# Patient Record
Sex: MALE | Race: WHITE | NOT HISPANIC OR LATINO | Employment: FULL TIME | URBAN - METROPOLITAN AREA
[De-identification: names, ages, dates, MRNs, and addresses within clinical notes are randomized per-mention and may not be internally consistent; named-entity substitution may affect disease eponyms.]

---

## 2023-10-30 ENCOUNTER — TELEPHONE (OUTPATIENT)
Dept: PRIMARY CARE | Facility: CLINIC | Age: 33
End: 2023-10-30
Payer: COMMERCIAL

## 2023-10-30 NOTE — TELEPHONE ENCOUNTER
Pt is wanting advice on if he should take an antibiotic for his tic bite. 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  Contact number- [phone]
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
## 2023-11-01 RX ORDER — DOXYCYCLINE 100 MG/1
200 CAPSULE ORAL ONCE
Qty: 2 CAPSULE | Refills: 0 | Status: SHIPPED | OUTPATIENT
Start: 2023-11-01 | End: 2023-11-01

## 2023-11-01 NOTE — TELEPHONE ENCOUNTER
Spoke to pt and he is unsure how long the tick was there, said at least 16 hours or more. Please advise

## 2024-01-23 ENCOUNTER — OFFICE VISIT (OUTPATIENT)
Dept: PRIMARY CARE | Facility: CLINIC | Age: 34
End: 2024-01-23
Payer: COMMERCIAL

## 2024-01-23 VITALS
TEMPERATURE: 97.5 F | WEIGHT: 164 LBS | BODY MASS INDEX: 22.21 KG/M2 | DIASTOLIC BLOOD PRESSURE: 72 MMHG | HEART RATE: 96 BPM | SYSTOLIC BLOOD PRESSURE: 118 MMHG | HEIGHT: 72 IN | OXYGEN SATURATION: 99 %

## 2024-01-23 DIAGNOSIS — J40 BRONCHITIS: ICD-10-CM

## 2024-01-23 DIAGNOSIS — J01.40 ACUTE NON-RECURRENT PANSINUSITIS: Primary | ICD-10-CM

## 2024-01-23 PROCEDURE — 99213 OFFICE O/P EST LOW 20 MIN: CPT

## 2024-01-23 PROCEDURE — 1036F TOBACCO NON-USER: CPT

## 2024-01-23 RX ORDER — FLUTICASONE PROPIONATE 50 MCG
1 SPRAY, SUSPENSION (ML) NASAL DAILY
Qty: 16 G | Refills: 5 | Status: SHIPPED | OUTPATIENT
Start: 2024-01-23 | End: 2025-01-22

## 2024-01-23 RX ORDER — AMOXICILLIN 875 MG/1
875 TABLET, FILM COATED ORAL 2 TIMES DAILY
Qty: 20 TABLET | Refills: 0 | Status: SHIPPED | OUTPATIENT
Start: 2024-01-23 | End: 2024-02-02

## 2024-01-23 RX ORDER — ALBUTEROL SULFATE 90 UG/1
2 AEROSOL, METERED RESPIRATORY (INHALATION) EVERY 4 HOURS PRN
Qty: 8.5 G | Refills: 0 | Status: SHIPPED | OUTPATIENT
Start: 2024-01-23 | End: 2025-01-22

## 2024-01-23 ASSESSMENT — PATIENT HEALTH QUESTIONNAIRE - PHQ9
SUM OF ALL RESPONSES TO PHQ9 QUESTIONS 1 AND 2: 0
2. FEELING DOWN, DEPRESSED OR HOPELESS: NOT AT ALL
1. LITTLE INTEREST OR PLEASURE IN DOING THINGS: NOT AT ALL

## 2024-01-23 ASSESSMENT — PAIN SCALES - GENERAL: PAINLEVEL: 0-NO PAIN

## 2024-01-23 NOTE — PROGRESS NOTES
Subjective   Patient ID: Nicola Toure is a 33 y.o. male who presents for Nasal Congestion (X 2 weeks/Negative for Strep, Flu, and Covid) and Cough.    HPI   Nicola presents with concerns for nasal congestion, PND, pressure in b/l ears and non-productive cough which all initially began about 2 weeks ago.  Episode of fever and chills at the beginning of the symptoms but resolved. He was seen at  on 1/13/24, diagnoses with URI and pharyngitis.  He had gone to  with concerns for strep throat as his children were positive for strep throat at that time.  His sore throat has since resolved.  He reports he was feeling better over the weekend (1/20-1/21) but then symptoms of nasal congestion, PND, and cough returned and seem to be worsening. He reports he has not taken any OTC medications for symptom relief. Denies dyspnea, SOB, or chest pain.     Review of Systems  All other systems have been reviewed and are negative except as noted in the HPI.     Objective   /72 (BP Location: Left arm)   Pulse 96   Temp 36.4 °C (97.5 °F) (Temporal)   Ht 1.829 m (6')   Wt 74.4 kg (164 lb)   SpO2 99%   BMI 22.24 kg/m²     Physical Exam  Vitals and nursing note reviewed.   Constitutional:       General: He is not in acute distress.     Appearance: Normal appearance. He is well-developed, well-groomed and normal weight.   HENT:      Right Ear: Hearing, ear canal and external ear normal. A middle ear effusion is present.      Left Ear: Hearing, tympanic membrane, ear canal and external ear normal.      Nose: Nasal tenderness, mucosal edema, congestion and rhinorrhea present. Rhinorrhea is clear.      Right Sinus: No maxillary sinus tenderness or frontal sinus tenderness.      Left Sinus: No maxillary sinus tenderness or frontal sinus tenderness.      Mouth/Throat:      Lips: Pink.      Mouth: Mucous membranes are dry.      Pharynx: Oropharynx is clear. Uvula midline. Posterior oropharyngeal erythema present. No  pharyngeal swelling, oropharyngeal exudate or uvula swelling.   Cardiovascular:      Rate and Rhythm: Normal rate and regular rhythm.      Heart sounds: Normal heart sounds, S1 normal and S2 normal.   Pulmonary:      Effort: Pulmonary effort is normal. No tachypnea, accessory muscle usage or respiratory distress.      Breath sounds: Normal air entry. No decreased air movement. Examination of the right-upper field reveals wheezing. Examination of the left-upper field reveals wheezing. Wheezing present. No decreased breath sounds, rhonchi or rales.   Musculoskeletal:      Cervical back: Normal range of motion and neck supple.   Lymphadenopathy:      Cervical: No cervical adenopathy.   Skin:     General: Skin is warm and dry.   Neurological:      General: No focal deficit present.      Mental Status: He is alert and oriented to person, place, and time. Mental status is at baseline.   Psychiatric:         Behavior: Behavior is cooperative.         Assessment/Plan   Problem List Items Addressed This Visit    None  Visit Diagnoses         Codes    Acute non-recurrent pansinusitis    -  Primary  Start antibiotics as directed and finish full course.  Reviewed risks, side effects, and expected treatment course.  May use OTC tylenol/ibuprofen, nasal or oral decongestants for symptom control as discussed.   May use saline nasal spray as discussed to thin mucus and relieve nasal symptoms.  Call if not rapidly improving over the next 5-7 days.   J01.40    Relevant Medications    amoxicillin (Amoxil) 875 mg tablet    fluticasone (Flonase) 50 mcg/actuation nasal spray    Bronchitis      Acute.   CXR deferred at this time as lungs are CTA and there is no signs of respiratory distress. SpO2 stable on RA  There is no reported SOB, CP, pleuritic pain, no fever.   Clinical suspicions of pneumonia or other cardiorespiratory catastrophe at this time are low.   Begin albuterol as prescribed.  Indications, administration, and side effects  discussed.  Discussed instructions for how to properly use inhaler, patient states understanding.  May continue with OTC medications for symptom relief as discussed.   Follow up in 1 week if symptoms persist.    J40    Relevant Medications    albuterol (ProAir HFA) 90 mcg/actuation inhaler

## 2024-02-05 ENCOUNTER — TELEPHONE (OUTPATIENT)
Dept: PRIMARY CARE | Facility: CLINIC | Age: 34
End: 2024-02-05
Payer: COMMERCIAL

## 2024-02-05 NOTE — TELEPHONE ENCOUNTER
Pt called answering service 2-3-24 at 1047 am [phone] finished ABX for bronchitis and still feel sick and is worse would like advice

## 2025-01-19 ENCOUNTER — OFFICE VISIT (OUTPATIENT)
Dept: URGENT CARE | Age: 35
End: 2025-01-19
Payer: COMMERCIAL

## 2025-01-19 VITALS
OXYGEN SATURATION: 99 % | DIASTOLIC BLOOD PRESSURE: 73 MMHG | SYSTOLIC BLOOD PRESSURE: 108 MMHG | BODY MASS INDEX: 19.94 KG/M2 | HEART RATE: 89 BPM | WEIGHT: 147 LBS | TEMPERATURE: 97.7 F | RESPIRATION RATE: 18 BRPM

## 2025-01-19 DIAGNOSIS — J01.90 ACUTE SINUSITIS, RECURRENCE NOT SPECIFIED, UNSPECIFIED LOCATION: Primary | ICD-10-CM

## 2025-01-19 PROCEDURE — 1036F TOBACCO NON-USER: CPT

## 2025-01-19 PROCEDURE — 99213 OFFICE O/P EST LOW 20 MIN: CPT

## 2025-01-19 RX ORDER — AMOXICILLIN AND CLAVULANATE POTASSIUM 875; 125 MG/1; MG/1
1 TABLET, FILM COATED ORAL 2 TIMES DAILY
Qty: 14 TABLET | Refills: 0 | Status: SHIPPED | OUTPATIENT
Start: 2025-01-19 | End: 2025-01-26

## 2025-01-19 ASSESSMENT — ENCOUNTER SYMPTOMS
SINUS PRESSURE: 1
COUGH: 1
SINUS PAIN: 1

## 2025-01-19 NOTE — PROGRESS NOTES
Subjective   Patient ID: Nicola Toure is a 34 y.o. male. They present today with a chief complaint of Cough (Coughing congestion x week nasal congestion sinus pressure ).    History of Present Illness  HPI    Past Medical History  Allergies as of 01/19/2025    (No Known Allergies)       (Not in a hospital admission)       No past medical history on file.    Past Surgical History:   Procedure Laterality Date    HIP SURGERY  05/2022        reports that he has never smoked. He has never used smokeless tobacco. He reports that he does not drink alcohol and does not use drugs.    Review of Systems  Review of Systems   HENT:  Positive for congestion, sinus pressure and sinus pain.    Respiratory:  Positive for cough.                                   Objective    Vitals:    01/19/25 0901   BP: 108/73   Pulse: 89   Resp: 18   Temp: 36.5 °C (97.7 °F)   SpO2: 99%   Weight: 66.7 kg (147 lb)     No LMP for male patient.    Physical Exam  Vitals reviewed.   Constitutional:       Appearance: Normal appearance.   HENT:      Head: Normocephalic and atraumatic.      Right Ear: Tympanic membrane, ear canal and external ear normal.      Left Ear: Tympanic membrane, ear canal and external ear normal.      Nose: Congestion present.      Mouth/Throat:      Mouth: Mucous membranes are moist.      Pharynx: Oropharynx is clear.   Eyes:      Conjunctiva/sclera: Conjunctivae normal.      Pupils: Pupils are equal, round, and reactive to light.   Cardiovascular:      Rate and Rhythm: Normal rate and regular rhythm.      Pulses: Normal pulses.      Heart sounds: Normal heart sounds.   Pulmonary:      Effort: Pulmonary effort is normal.      Breath sounds: Normal breath sounds.   Musculoskeletal:         General: Normal range of motion.      Cervical back: Normal range of motion.   Skin:     General: Skin is warm.      Capillary Refill: Capillary refill takes less than 2 seconds.   Neurological:      General: No focal deficit present.       Mental Status: He is alert and oriented to person, place, and time.   Psychiatric:         Mood and Affect: Mood normal.         Behavior: Behavior normal.         Procedures    Point of Care Test & Imaging Results from this visit  No results found for this visit on 01/19/25.   No results found.    Diagnostic study results (if any) were reviewed by OSCAR Fuller.    Assessment/Plan   Allergies, medications, history, and pertinent labs/EKGs/Imaging reviewed by OSCAR Fuller.     Medical Decision Making  34-year-old male presents for sinus pain and pressure congestion over a week.  Patient reports that he is having increasing sinus pain and pressure on exam he is in no acute distress vital signs are stable heart rate is regular lungs are clear bilaterally he denies chest pain shortness of breath.  He denies dizziness or headache.  On exam patient is maxillary sinus pain and pressure.  Nasal congestion patient is to start Augmentin as directed patient is to use nasal saline daily and teaspoon of honey daily for cough patient agrees with plan of care patient left in stable condition    Orders and Diagnoses  There are no diagnoses linked to this encounter.    Medical Admin Record      Patient disposition: Home    Electronically signed by OSCAR Fuller  9:13 AM

## 2025-01-19 NOTE — PATIENT INSTRUCTIONS
Start Augmentin as directed for sinus infection, go to emergency department with worsening symptoms, follow up with primary care doctor, try teaspoon of honey daily, nasal saline.